# Patient Record
Sex: MALE | Race: WHITE | NOT HISPANIC OR LATINO | URBAN - METROPOLITAN AREA
[De-identification: names, ages, dates, MRNs, and addresses within clinical notes are randomized per-mention and may not be internally consistent; named-entity substitution may affect disease eponyms.]

---

## 2020-01-24 ENCOUNTER — OFFICE (OUTPATIENT)
Dept: URBAN - METROPOLITAN AREA CLINIC 45 | Facility: CLINIC | Age: 40
End: 2020-01-24

## 2020-01-24 VITALS
HEIGHT: 71 IN | DIASTOLIC BLOOD PRESSURE: 84 MMHG | SYSTOLIC BLOOD PRESSURE: 123 MMHG | HEART RATE: 81 BPM | WEIGHT: 144 LBS

## 2020-01-24 DIAGNOSIS — R63.4 WEIGHT LOSS: ICD-10-CM

## 2020-01-24 DIAGNOSIS — R10.11 RUQ PAIN: ICD-10-CM

## 2020-01-24 PROCEDURE — 99204 OFFICE O/P NEW MOD 45 MIN: CPT | Performed by: INTERNAL MEDICINE

## 2020-01-24 NOTE — SERVICEHPINOTES
This is my first time assessment of this generally healthy 39-year-old gentleman.  He is coming in to see if I can discover the cause of the severe pain which caused him to go to the emergency room shortly after the holidays.  This began when he was bending over and coughed.  He states his right upper quadrant seized up in severe discomfort.  He went to [facility] where  labs and a CT scan with IV contrast was unremarkable.  He still has occasional discomfort.  He is also has some lingering symptoms over a couple of years with discomfort which may worsen after eating.  He has recently been following a healthier diet and has eliminated alcohol and lost 20 pounds over the last 5 years.  I did review his laboratories including lipase, CBC and chemistries which are unremarkable.  CT scan with IV contrast unremarkable.  Bowel movements are normal without diarrhea or steatorrhea.  He has been on Humira for 5 years for psoriasis.

## 2021-04-01 ENCOUNTER — OFFICE (OUTPATIENT)
Dept: URBAN - METROPOLITAN AREA CLINIC 45 | Facility: CLINIC | Age: 41
End: 2021-04-01

## 2021-04-01 VITALS — HEIGHT: 71 IN

## 2021-04-01 DIAGNOSIS — R10.11 RUQ PAIN: ICD-10-CM

## 2021-04-01 PROCEDURE — G0406 INPT/TELE FOLLOW UP 15: HCPCS | Performed by: INTERNAL MEDICINE

## 2021-04-01 PROCEDURE — 99214 OFFICE O/P EST MOD 30 MIN: CPT | Performed by: INTERNAL MEDICINE

## 2021-04-01 RX ORDER — PANTOPRAZOLE SODIUM 40 MG/1
40 TABLET, DELAYED RELEASE ORAL
Qty: 30 | Status: ACTIVE
Start: 2021-04-01

## 2021-04-01 NOTE — SERVICEHPINOTES
The patient had been seen by me once before in January 2020.  He is following up now the suggestion of his primary care doctor for lingering symptoms.  He is still having some upper epigastric discomfort which radiates to the right costal margin.  Symptoms remain quite similar.  They have worsened with stress.  Last summer he did have surgery for an umbilical hernia.  His symptoms are described as aching, constant and manageable.  It improves with heat.  Sometimes it will get worse after eating but does not have a burning or acidic quality.  It does not appear to be movement related.  No weight loss or rectal bleeding.   Prior ultrasound was normal except for mild hepatomegaly.  CT scan done in emergency room December 2019 was normal.

## 2021-07-27 ENCOUNTER — WALK IN (OUTPATIENT)
Dept: URGENT CARE | Age: 41
End: 2021-07-27

## 2021-07-27 VITALS
WEIGHT: 139.2 LBS | DIASTOLIC BLOOD PRESSURE: 99 MMHG | OXYGEN SATURATION: 99 % | RESPIRATION RATE: 18 BRPM | SYSTOLIC BLOOD PRESSURE: 164 MMHG | TEMPERATURE: 99.2 F | HEART RATE: 94 BPM

## 2021-07-27 DIAGNOSIS — J06.9 VIRAL UPPER RESPIRATORY TRACT INFECTION: Primary | ICD-10-CM

## 2021-07-27 LAB — SARS-COV+SARS-COV-2 AG RESP QL IA.RAPID: NOT DETECTED

## 2021-07-27 PROCEDURE — U0005 INFEC AGEN DETEC AMPLI PROBE: HCPCS | Performed by: INTERNAL MEDICINE

## 2021-07-27 PROCEDURE — U0003 INFECTIOUS AGENT DETECTION BY NUCLEIC ACID (DNA OR RNA); SEVERE ACUTE RESPIRATORY SYNDROME CORONAVIRUS 2 (SARS-COV-2) (CORONAVIRUS DISEASE [COVID-19]), AMPLIFIED PROBE TECHNIQUE, MAKING USE OF HIGH THROUGHPUT TECHNOLOGIES AS DESCRIBED BY CMS-2020-01-R: HCPCS | Performed by: INTERNAL MEDICINE

## 2021-07-27 PROCEDURE — 99213 OFFICE O/P EST LOW 20 MIN: CPT | Performed by: EMERGENCY MEDICINE

## 2021-07-27 PROCEDURE — 87426 SARSCOV CORONAVIRUS AG IA: CPT | Performed by: EMERGENCY MEDICINE

## 2021-07-27 RX ORDER — DESVENLAFAXINE 25 MG/1
TABLET, EXTENDED RELEASE ORAL
COMMUNITY
Start: 2021-07-06 | End: 2021-07-27

## 2021-07-27 RX ORDER — DESVENLAFAXINE SUCCINATE 50 MG/1
50 TABLET, EXTENDED RELEASE ORAL DAILY
COMMUNITY
Start: 2021-07-03

## 2021-07-27 RX ORDER — PANTOPRAZOLE SODIUM 40 MG/1
40 TABLET, DELAYED RELEASE ORAL
COMMUNITY
Start: 2021-05-28 | End: 2021-07-27

## 2021-07-28 ENCOUNTER — TELEPHONE (OUTPATIENT)
Dept: URGENT CARE | Age: 41
End: 2021-07-28

## 2021-07-28 LAB
SARS-COV-2 RNA RESP QL NAA+PROBE: NOT DETECTED
SERVICE CMNT-IMP: NORMAL
SERVICE CMNT-IMP: NORMAL

## 2022-03-23 ENCOUNTER — OFFICE (OUTPATIENT)
Dept: URBAN - METROPOLITAN AREA CLINIC 45 | Facility: CLINIC | Age: 42
End: 2022-03-23

## 2022-03-23 VITALS — HEIGHT: 71 IN | WEIGHT: 145 LBS

## 2022-03-23 DIAGNOSIS — R10.13 EPIGASTRIC PAIN: ICD-10-CM

## 2022-03-23 DIAGNOSIS — Z80.0 FAMILY HISTORY OF MALIGNANT NEOPLASM OF COLON: ICD-10-CM

## 2022-03-23 DIAGNOSIS — K62.5 RECTAL BLEEDING: ICD-10-CM

## 2022-03-23 DIAGNOSIS — R63.4 WEIGHT LOSS: ICD-10-CM

## 2022-03-23 DIAGNOSIS — R19.4 ALTERED BOWEL HABITS: ICD-10-CM

## 2022-03-23 PROCEDURE — G0406 INPT/TELE FOLLOW UP 15: HCPCS | Performed by: INTERNAL MEDICINE

## 2022-03-23 PROCEDURE — 99214 OFFICE O/P EST MOD 30 MIN: CPT | Mod: 95 | Performed by: INTERNAL MEDICINE

## 2022-03-23 NOTE — SERVICEHPINOTES
The patient was last seen one year ago.  He is following up with us today in order to schedule colonoscopy and upper endoscopy.  He reports that his brother was just diagnosed with several precancerous polyps.  The patient has noted an irregular change in his bowel habits with frequent diarrhea and occasional rectal bleeding.  Some of this may be hemorrhoidal but he is not sure.  He has had persistent epigastric pain despite a trial of proton pump inhibitor therapy.  It is mid-upper epigastric in location.  Previous labs and ultrasound were unremarkable and he is interested in doing the upper endoscopy as well.  There is no vomiting or weight loss.